# Patient Record
Sex: FEMALE | Race: BLACK OR AFRICAN AMERICAN
[De-identification: names, ages, dates, MRNs, and addresses within clinical notes are randomized per-mention and may not be internally consistent; named-entity substitution may affect disease eponyms.]

---

## 2020-01-07 ENCOUNTER — HOSPITAL ENCOUNTER (EMERGENCY)
Dept: HOSPITAL 54 - ER | Age: 35
Discharge: HOME | End: 2020-01-07
Payer: SELF-PAY

## 2020-01-07 VITALS — HEIGHT: 64 IN | WEIGHT: 117 LBS | BODY MASS INDEX: 19.97 KG/M2

## 2020-01-07 VITALS — DIASTOLIC BLOOD PRESSURE: 88 MMHG | SYSTOLIC BLOOD PRESSURE: 127 MMHG

## 2020-01-07 DIAGNOSIS — L08.9: Primary | ICD-10-CM

## 2020-01-07 DIAGNOSIS — Z60.2: ICD-10-CM

## 2020-01-07 SDOH — SOCIAL STABILITY - SOCIAL INSECURITY: PROBLEMS RELATED TO LIVING ALONE: Z60.2

## 2020-01-07 NOTE — NUR
PT AAOX4. AMBULATORY. C/O INFECTED INGROWN NAIL,LEFT GREAT TOE. PT STATES SHE 
CAME IN BECAUSE SHE COULD NOT GET AHOLD OF HER MD. PLACED ON MONITOR AND PULSE 
OX. AWAITING MD FOR EVAL.